# Patient Record
Sex: FEMALE | Race: ASIAN | NOT HISPANIC OR LATINO | URBAN - METROPOLITAN AREA
[De-identification: names, ages, dates, MRNs, and addresses within clinical notes are randomized per-mention and may not be internally consistent; named-entity substitution may affect disease eponyms.]

---

## 2017-05-23 ENCOUNTER — OFFICE VISIT - HEALTHEAST (OUTPATIENT)
Dept: FAMILY MEDICINE | Facility: CLINIC | Age: 47
End: 2017-05-23

## 2017-05-23 ENCOUNTER — COMMUNICATION - HEALTHEAST (OUTPATIENT)
Dept: TELEHEALTH | Facility: CLINIC | Age: 47
End: 2017-05-23

## 2017-05-23 DIAGNOSIS — Z00.00 ROUTINE GENERAL MEDICAL EXAMINATION AT A HEALTH CARE FACILITY: ICD-10-CM

## 2017-05-23 LAB
CHOLEST SERPL-MCNC: 212 MG/DL
FASTING STATUS PATIENT QL REPORTED: ABNORMAL
HDLC SERPL-MCNC: 74 MG/DL
LDLC SERPL CALC-MCNC: 128 MG/DL
TRIGL SERPL-MCNC: 48 MG/DL

## 2017-05-23 ASSESSMENT — MIFFLIN-ST. JEOR: SCORE: 990.1

## 2017-05-24 ENCOUNTER — COMMUNICATION - HEALTHEAST (OUTPATIENT)
Dept: FAMILY MEDICINE | Facility: CLINIC | Age: 47
End: 2017-05-24

## 2017-05-26 LAB
HPV INTERPRETATION - HISTORICAL: NORMAL
HPV INTERPRETER - HISTORICAL: NORMAL

## 2017-05-31 LAB
BKR LAB AP ABNORMAL BLEEDING: YES
BKR LAB AP BIRTH CONTROL/HORMONES: NORMAL
BKR LAB AP CERVICAL APPEARANCE: NORMAL
BKR LAB AP GYN ADEQUACY: NORMAL
BKR LAB AP GYN INTERPRETATION: NORMAL
BKR LAB AP HPV REFLEX: NORMAL
BKR LAB AP LMP: NORMAL
BKR LAB AP PATIENT STATUS: NORMAL
BKR LAB AP PREVIOUS ABNORMAL: NORMAL
BKR LAB AP PREVIOUS NORMAL: NORMAL
HIGH RISK?: YES
PATH REPORT.COMMENTS IMP SPEC: NORMAL
RESULT FLAG (HE HISTORICAL CONVERSION): NORMAL

## 2017-06-01 ENCOUNTER — COMMUNICATION - HEALTHEAST (OUTPATIENT)
Dept: FAMILY MEDICINE | Facility: CLINIC | Age: 47
End: 2017-06-01

## 2020-06-23 ENCOUNTER — RECORDS - HEALTHEAST (OUTPATIENT)
Dept: LAB | Facility: CLINIC | Age: 50
End: 2020-06-23

## 2020-06-23 LAB — ERYTHROCYTE [SEDIMENTATION RATE] IN BLOOD BY WESTERGREN METHOD: 8 MM/HR (ref 0–20)

## 2020-06-26 LAB — ANA SER QL: 0.1 U

## 2020-12-08 ENCOUNTER — OFFICE VISIT - HEALTHEAST (OUTPATIENT)
Dept: FAMILY MEDICINE | Facility: CLINIC | Age: 50
End: 2020-12-08

## 2020-12-08 DIAGNOSIS — K13.70 LESION OF BUCCAL MUCOSA: ICD-10-CM

## 2020-12-08 DIAGNOSIS — Z00.00 ROUTINE GENERAL MEDICAL EXAMINATION AT A HEALTH CARE FACILITY: ICD-10-CM

## 2020-12-08 DIAGNOSIS — Z12.11 COLON CANCER SCREENING: ICD-10-CM

## 2020-12-08 LAB
ALBUMIN SERPL-MCNC: 4.4 G/DL (ref 3.5–5)
ALP SERPL-CCNC: 81 U/L (ref 45–120)
ALT SERPL W P-5'-P-CCNC: 29 U/L (ref 0–45)
ANION GAP SERPL CALCULATED.3IONS-SCNC: 10 MMOL/L (ref 5–18)
AST SERPL W P-5'-P-CCNC: 24 U/L (ref 0–40)
BASOPHILS # BLD AUTO: 0 THOU/UL (ref 0–0.2)
BASOPHILS NFR BLD AUTO: 0 % (ref 0–2)
BILIRUB SERPL-MCNC: 0.7 MG/DL (ref 0–1)
BUN SERPL-MCNC: 11 MG/DL (ref 8–22)
CALCIUM SERPL-MCNC: 9.2 MG/DL (ref 8.5–10.5)
CHLORIDE BLD-SCNC: 102 MMOL/L (ref 98–107)
CHOLEST SERPL-MCNC: 197 MG/DL
CO2 SERPL-SCNC: 28 MMOL/L (ref 22–31)
CREAT SERPL-MCNC: 0.7 MG/DL (ref 0.6–1.1)
EOSINOPHIL # BLD AUTO: 0.1 THOU/UL (ref 0–0.4)
EOSINOPHIL NFR BLD AUTO: 2 % (ref 0–6)
ERYTHROCYTE [DISTWIDTH] IN BLOOD BY AUTOMATED COUNT: 11.5 % (ref 11–14.5)
FASTING STATUS PATIENT QL REPORTED: NORMAL
GFR SERPL CREATININE-BSD FRML MDRD: >60 ML/MIN/1.73M2
GLUCOSE BLD-MCNC: 86 MG/DL (ref 70–125)
HBA1C MFR BLD: 5.7 %
HCT VFR BLD AUTO: 40.4 % (ref 35–47)
HDLC SERPL-MCNC: 78 MG/DL
HGB BLD-MCNC: 13.5 G/DL (ref 12–16)
LDLC SERPL CALC-MCNC: 107 MG/DL
LYMPHOCYTES # BLD AUTO: 2.3 THOU/UL (ref 0.8–4.4)
LYMPHOCYTES NFR BLD AUTO: 45 % (ref 20–40)
MCH RBC QN AUTO: 30.4 PG (ref 27–34)
MCHC RBC AUTO-ENTMCNC: 33.4 G/DL (ref 32–36)
MCV RBC AUTO: 91 FL (ref 80–100)
MONOCYTES # BLD AUTO: 0.4 THOU/UL (ref 0–0.9)
MONOCYTES NFR BLD AUTO: 8 % (ref 2–10)
NEUTROPHILS # BLD AUTO: 2.3 THOU/UL (ref 2–7.7)
NEUTROPHILS NFR BLD AUTO: 45 % (ref 50–70)
PLATELET # BLD AUTO: 181 THOU/UL (ref 140–440)
PMV BLD AUTO: 7.6 FL (ref 7–10)
POTASSIUM BLD-SCNC: 3.8 MMOL/L (ref 3.5–5)
PROT SERPL-MCNC: 7.3 G/DL (ref 6–8)
RBC # BLD AUTO: 4.44 MILL/UL (ref 3.8–5.4)
SODIUM SERPL-SCNC: 140 MMOL/L (ref 136–145)
TRIGL SERPL-MCNC: 61 MG/DL
TSH SERPL DL<=0.005 MIU/L-ACNC: 2.37 UIU/ML (ref 0.3–5)
WBC: 5.1 THOU/UL (ref 4–11)

## 2020-12-08 ASSESSMENT — MIFFLIN-ST. JEOR: SCORE: 949.28

## 2020-12-09 ENCOUNTER — COMMUNICATION - HEALTHEAST (OUTPATIENT)
Dept: FAMILY MEDICINE | Facility: CLINIC | Age: 50
End: 2020-12-09

## 2020-12-09 LAB
25(OH)D3 SERPL-MCNC: 36.1 NG/ML (ref 30–80)
25(OH)D3 SERPL-MCNC: 36.1 NG/ML (ref 30–80)

## 2020-12-11 ENCOUNTER — OFFICE VISIT - HEALTHEAST (OUTPATIENT)
Dept: OTOLARYNGOLOGY | Facility: CLINIC | Age: 50
End: 2020-12-11

## 2020-12-11 ENCOUNTER — AMBULATORY - HEALTHEAST (OUTPATIENT)
Dept: FAMILY MEDICINE | Facility: CLINIC | Age: 50
End: 2020-12-11

## 2020-12-11 DIAGNOSIS — K05.10 GINGIVITIS: ICD-10-CM

## 2020-12-11 DIAGNOSIS — R73.03 PREDIABETES: ICD-10-CM

## 2021-05-31 VITALS — BODY MASS INDEX: 22.46 KG/M2 | WEIGHT: 107 LBS | HEIGHT: 58 IN

## 2021-06-05 VITALS
TEMPERATURE: 97.9 F | HEIGHT: 58 IN | RESPIRATION RATE: 14 BRPM | DIASTOLIC BLOOD PRESSURE: 70 MMHG | HEART RATE: 67 BPM | SYSTOLIC BLOOD PRESSURE: 111 MMHG | WEIGHT: 98 LBS | BODY MASS INDEX: 20.57 KG/M2
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
## 2021-06-13 NOTE — PROGRESS NOTES
"Assessment:      Healthy female exam.    1. Routine general medical examination at a health care facility  Comprehensive Metabolic Panel    Lipid Cascade FASTING    HM1(CBC and Differential)    Thyroid Stimulating Hormone (TSH)    Vitamin D, Total (25-Hydroxy)    Glycosylated Hemoglobin A1c   2. Colon cancer screening  Cologuard   3. Lesion of buccal mucosa  Ambulatory referral to ENT          Plan:       This is a 51 yo female here for physical exam:  1.  Oral lesion - on left buccal mucosa - she has noticed this for several days/weeks.  It is not clear if it is truly symptomatic to the patient.  Will refer to ENT for evaluation.    2.  General anxiety about medical condition - she is certain she needs labs related to her concerns.  WE have discussed recommendations.  See orders.    3.  Health Maintenance - due for colon cancer screening - declines colonoscopy; will do Cologuard.      Subjective:      Carol Min is a 50 y.o. female who presents for an annual exam.. The patient reports that there is not domestic violence in her life.     In last month or two, has noticed something in her mouth -   Saw dentist last year   Never had anything like that before  Sometimes feels like burning -   No fever or chills  Hasn't noticed any lumps/ bumps  No tooth problems    Last week - felt like fullness in stomach - full after 3 hours  Like not digesting feels like left side abdomen is higher than previous     This year, January - had something on face -   Has seen 2 family doctors and then a skin specialist -   Had biopsy - showed \"nothing\"     Pathology Report                                  Case: [id number]                                  Authorizing Provider:  Yessy Granados PA        Collected:           09/01/2020 1020              Ordering Location:     [facility]      Received:            09/01/2020 [address] [place] [address]" "                 Pathologist:           Sulaiman Jimenez MD                                                      Specimen:    Face, a) right nasal labial fold                                                          Final Diagnosis A) SKIN, RIGHT NASAL LABIAL FOLD, BIOPSY:  1. Consistent with rosacea  2. Negative for malignancy     Clinical Information A) lupus vs contact/irritant dermatitis vs rosacea     Gross Description A) Received in formalin, labeled with the patient's name and \"right nasal labial fold,\" is a 0.5 x 0.3 x 0.3 cm skin punch biopsy. The skin surface is smooth, tan and a discrete lesion is not identified.  The specimen is inked black, bisected and entirely submitted in one cassette.    MYRIAM 2020     Microscopic Description The final diagnosis is based on microscopic examination of appropriate sections of all specimens.      A) There is upper dermal telangectasia and a perivascular and perifollicular chronic inflammatory infiltrate. Solar elastosis is present. The epidermis is mildly spongiotic. The presence of  black ink is confirmed on tissue sections. Deeper level sections are evaluated.     Additional Information   Interpreted at [facility], [facility] - [address], [place] [address]      When she does things, then feels like ace is redder at upper nasal bridge and nasolabial folds    Hasn't eaten today - wants blood tests  CMP  Hem 2  Lipid   Vitamin d    Wants a1c as well -   Doesn't know how her grandparents  - MGF -  young 60s - ?cause; PGM -  young - ?cause;   Paternal uncle -  age 76 - immune disorder?? In ICU x 3 months    Hasn't had COVID  Lost her job due to COVID    Born in [place]  To  ,   No children        Healthy Habits:   Regular Exercise: No  Sunscreen Use: No  Healthy Diet: Yes  Dental Visits Regularly: Yes  Seat Belt: Yes  Sexually active: Yes  Self Breast Exam Monthly:No  Hemoccults: No  Flex Sig: " No  Colonoscopy: declines but will do Cologuard          There is no immunization history on file for this patient.  Immunization status: reviewed.    No exam data present    Gynecologic History  No LMP recorded. Patient is postmenopausal., 2017  Contraception: post menopausal status  Last Pap:2017. Results were: normal  Last mammogram: unknown. Results were: normal      OB History   No obstetric history on file.       No current outpatient medications on file.     No current facility-administered medications for this visit.      No past medical history on file.  No past surgical history on file.  Patient has no known allergies.  Family History   Problem Relation Age of Onset     Pulmonary embolism Mother      Anemia Mother      Anemia Father      Lymphoma Father      Alcohol abuse Brother      Social History     Socioeconomic History     Marital status:      Spouse name: Not on file     Number of children: Not on file     Years of education: Not on file     Highest education level: Not on file   Occupational History     Not on file   Social Needs     Financial resource strain: Not on file     Food insecurity     Worry: Not on file     Inability: Not on file     Transportation needs     Medical: Not on file     Non-medical: Not on file   Tobacco Use     Smoking status: Never Smoker     Smokeless tobacco: Never Used   Substance and Sexual Activity     Alcohol use: Not on file     Drug use: Not on file     Sexual activity: Not on file   Lifestyle     Physical activity     Days per week: Not on file     Minutes per session: Not on file     Stress: Not on file   Relationships     Social connections     Talks on phone: Not on file     Gets together: Not on file     Attends Restorationism service: Not on file     Active member of club or organization: Not on file     Attends meetings of clubs or organizations: Not on file     Relationship status: Not on file     Intimate partner violence     Fear of current or ex  "partner: Not on file     Emotionally abused: Not on file     Physically abused: Not on file     Forced sexual activity: Not on file   Other Topics Concern     Not on file   Social History Narrative     Not on file       Review of Systems  Review of Systems     Pertinent positives as noted in HPI; otherwise 12 point ROS negative.        Objective:         Vitals:    12/08/20 1338   BP: 111/70   Pulse: 67   Resp: 14   Temp: 97.9  F (36.6  C)   TempSrc: Temporal   Weight: (!) 98 lb (44.5 kg)   Height: 4' 10\" (1.473 m)     Body mass index is 20.48 kg/m .    Physical  Physical Exam   EXAM:  /70 (Patient Site: Right Arm, Patient Position: Sitting, Cuff Size: Adult Small)   Pulse 67   Temp 97.9  F (36.6  C) (Temporal)   Resp 14   Ht 4' 10\" (1.473 m)   Wt (!) 98 lb (44.5 kg)   BMI 20.48 kg/m     Gen:  NAD, appears well, well-hydrated  HEENT:  TMs nl, oropharynx benign, nasal mucosa nl, conjunctiva clear  Neck:  Supple, no adenopathy, no thyromegaly, no carotid bruits, no JVD  Lungs:  Clear to auscultation bilaterally  Breast exam:  No breast lumps, no skin changes, no nipple discharge, no axillary adenopathy=  Cor:  RRR no murmur  Abd:  Soft, nontender, BS+, no masses, no guarding or rebound, no HSM  Extr:  Neg.  Neuro:  No asymmetry, Nl motor tone/strength, nl sensation, reflexes =, gait nl, nl coordination, CN intact,   Skin:  Warm/dry           "

## 2021-06-13 NOTE — PROGRESS NOTES
HPI: This patient is a 49yo F who presents to clinic for evaluation of a lesion on her buccal mucosa at the request of Dr. Clark. About a month or so ago, she noticed an episodic mild burning sensation in her mouth. As a result, she was feeling around with her tongue and felt a little surface bumpiness up near her teeth on the left side. She reports no changes, pain, or bleeding associated with it. She just wants to make sure there is nothing wrong there. Denies fevers, unintentional weight loss, odynophagia, dysphagia, hemoptysis, voice changes, and shortness of breath. She has not seen a dentist for routine cleaning since 11/2019.    Past medical history, surgical history, social history, family history, medications, and allergies have been reviewed with the patient and are documented above.    Review of Systems: a 10-system review was performed. Pertinent positives are noted in the HPI and on a separate scanned document in the chart.    PHYSICAL EXAMINATION:  GEN: no acute distress, normocephalic  EYES: extraocular movements are intact, pupils are equal and round. Sclera clear.   EARS: auricles are normally formed. The external auditory canals are clear with minimal to no cerumen. Tympanic membranes are intact bilaterally with no signs of infection, effusion, retractions, or perforations.  NOSE: anterior nares are patent.   OC/OP: clear, dentition is in good repair with with some plaque and gum recession. The tongue and palate are fully mobile and symmetric. The floor of mouth, base of tongue are soft and symmetric. No masses, lesions, or mucosal irregularities are noted. There appears to be very mild gingivitis in the areas of gum recession/plaque. The bump she feels with her tongue is the papilla for Sarkis's duct, and there are no abnormalities of this just slightly more prominent than the right side.  NECK: soft and supple. No lymphadenopathy or masses. Airway is midline.  NEURO: CN VII and XII  symmetric. alert and oriented. No spontaneous nystagmus. Gait is normal.  PULM: breathing comfortably on room air, normal chest expansion with respiration  CARDS: no cyanosis or clubbing, normal carotid pulses    MEDICAL DECISION-MAKING: This patient is a 49yo M with what appears to be very mild gingivitis at the most. Reassured her that she has no masses or lesions of concern. The episodic burning sensation has basically resolved and is typically a self-limited issue. Discussed saline mouth rinses and dental cleaning.

## 2021-06-16 PROBLEM — R73.03 PREDIABETES: Status: ACTIVE | Noted: 2020-12-11

## 2021-06-26 ENCOUNTER — HEALTH MAINTENANCE LETTER (OUTPATIENT)
Age: 51
End: 2021-06-26

## 2021-10-16 ENCOUNTER — HEALTH MAINTENANCE LETTER (OUTPATIENT)
Age: 51
End: 2021-10-16

## 2022-01-30 ENCOUNTER — HEALTH MAINTENANCE LETTER (OUTPATIENT)
Age: 52
End: 2022-01-30

## 2022-07-17 ENCOUNTER — HEALTH MAINTENANCE LETTER (OUTPATIENT)
Age: 52
End: 2022-07-17

## 2022-09-25 ENCOUNTER — HEALTH MAINTENANCE LETTER (OUTPATIENT)
Age: 52
End: 2022-09-25

## 2023-02-04 ENCOUNTER — HEALTH MAINTENANCE LETTER (OUTPATIENT)
Age: 53
End: 2023-02-04

## 2023-08-05 ENCOUNTER — HEALTH MAINTENANCE LETTER (OUTPATIENT)
Age: 53
End: 2023-08-05

## 2024-03-02 ENCOUNTER — HEALTH MAINTENANCE LETTER (OUTPATIENT)
Age: 54
End: 2024-03-02